# Patient Record
Sex: FEMALE | Race: WHITE | Employment: FULL TIME | URBAN - METROPOLITAN AREA
[De-identification: names, ages, dates, MRNs, and addresses within clinical notes are randomized per-mention and may not be internally consistent; named-entity substitution may affect disease eponyms.]

---

## 2019-04-11 ENCOUNTER — OFFICE VISIT (OUTPATIENT)
Dept: UROLOGY | Age: 39
End: 2019-04-11
Payer: COMMERCIAL

## 2019-04-11 ENCOUNTER — HOSPITAL ENCOUNTER (OUTPATIENT)
Age: 39
Setting detail: SPECIMEN
Discharge: HOME | End: 2019-04-11
Payer: COMMERCIAL

## 2019-04-11 VITALS
DIASTOLIC BLOOD PRESSURE: 88 MMHG | HEART RATE: 76 BPM | SYSTOLIC BLOOD PRESSURE: 130 MMHG | TEMPERATURE: 97.5 F | BODY MASS INDEX: 31.1 KG/M2 | HEIGHT: 62 IN | WEIGHT: 169 LBS

## 2019-04-11 DIAGNOSIS — R31.0 GROSS HEMATURIA: Primary | ICD-10-CM

## 2019-04-11 DIAGNOSIS — R31.0 GROSS HEMATURIA: ICD-10-CM

## 2019-04-11 PROBLEM — I77.74 VERTEBRAL ARTERY DISSECTION (HCC): Status: ACTIVE | Noted: 2018-04-20

## 2019-04-11 PROBLEM — I63.9 CEREBELLAR STROKE (HCC): Status: ACTIVE | Noted: 2018-05-09

## 2019-04-11 LAB
-: ABNORMAL
AMORPHOUS: ABNORMAL
BACTERIA: ABNORMAL
BILIRUBIN URINE: NEGATIVE
CASTS UA: ABNORMAL /LPF
COLOR: YELLOW
COMMENT UA: ABNORMAL
CRYSTALS, UA: ABNORMAL /HPF
EPITHELIAL CELLS UA: ABNORMAL /HPF (ref 0–25)
GLUCOSE URINE: NEGATIVE
KETONES, URINE: NEGATIVE
LEUKOCYTE ESTERASE, URINE: NEGATIVE
MUCUS: ABNORMAL
NITRITE, URINE: NEGATIVE
OTHER OBSERVATIONS UA: ABNORMAL
PH UA: 6 (ref 5–9)
PROTEIN UA: NEGATIVE
RBC UA: ABNORMAL /HPF (ref 0–2)
RENAL EPITHELIAL, UA: ABNORMAL /HPF
SPECIFIC GRAVITY UA: 1.02 (ref 1.01–1.02)
TRICHOMONAS: ABNORMAL
TURBIDITY: CLEAR
URINE HGB: NEGATIVE
UROBILINOGEN, URINE: NORMAL
WBC UA: ABNORMAL /HPF (ref 0–5)
YEAST: ABNORMAL

## 2019-04-11 PROCEDURE — 99204 OFFICE O/P NEW MOD 45 MIN: CPT | Performed by: NURSE PRACTITIONER

## 2019-04-11 PROCEDURE — 81001 URINALYSIS AUTO W/SCOPE: CPT

## 2019-04-11 PROCEDURE — 87086 URINE CULTURE/COLONY COUNT: CPT

## 2019-04-11 RX ORDER — CLOPIDOGREL BISULFATE 75 MG/1
75 TABLET ORAL
COMMUNITY
Start: 2018-04-24 | End: 2019-04-11 | Stop reason: ALTCHOICE

## 2019-04-11 RX ORDER — ATORVASTATIN CALCIUM 10 MG/1
10 TABLET, FILM COATED ORAL DAILY
COMMUNITY
Start: 2018-04-23

## 2019-04-11 RX ORDER — ESCITALOPRAM OXALATE 10 MG/1
10 TABLET ORAL DAILY
COMMUNITY
Start: 2019-02-11

## 2019-04-11 RX ORDER — ALPRAZOLAM 0.25 MG/1
0.25 TABLET ORAL
COMMUNITY
End: 2019-04-29 | Stop reason: ALTCHOICE

## 2019-04-11 ASSESSMENT — ENCOUNTER SYMPTOMS
ABDOMINAL PAIN: 0
WHEEZING: 0
COUGH: 0
NAUSEA: 0
COLOR CHANGE: 0
SHORTNESS OF BREATH: 0
CONSTIPATION: 0
EYE REDNESS: 0
EYE PAIN: 0
VOMITING: 0
BACK PAIN: 0

## 2019-04-11 NOTE — PATIENT INSTRUCTIONS
SURVEY:    You may be receiving a survey from [employer] regarding your visit today. Please complete the survey to enable us to provide the highest quality of care to you and your family. If you cannot score us a very good on any question, please call the office to discuss how we could have made your experience a very good one. Thank you.

## 2019-04-11 NOTE — PROGRESS NOTES
HPI:    Patient is a 41 y.o. female in no acute distress. She is alert and oriented to person, place, and time. New patient self referral for gross hematuria. Patient has had intermittent gross hematuria for the last 6 months. This is not associated with flank pain, but she did have lower abdominal pressure. She denies history of frequent urinary tract infections. She did have an episode of spontaneous stone passage several years ago and she did see Dr. Ciara Rapp for this. She denies baseline frequency, urgency, nocturia, or incontinence. 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 She was on Plavix, but this was stopped in 8/2018. She does take daily aspirin. Patient smoked for roughly 6 months in her early 25s. She does work as a .      Past Medical History:   Diagnosis Date    [facility] [address]     Hypertension     Kidney stone     Stroke (cerebrum) ([facility] [address] 2018     Past Surgical History:   Procedure Laterality Date    BREAST ENHANCEMENT SURGERY Left     ENDOMETRIAL ABLATION      TONSILLECTOMY      TUBAL LIGATION      WISDOM TOOTH EXTRACTION       Outpatient Encounter Medications as of 4/11/2019   Medication Sig Dispense Refill    aspirin 81 MG tablet Take 81 mg by mouth      atorvastatin (LIPITOR) 10 MG tablet Take 10 mg by mouth      escitalopram (LEXAPRO) 10 MG tablet       ALPRAZolam (XANAX) 0.25 MG tablet Take 0.25 mg by mouth. Indications: PRN       [DISCONTINUED] clopidogrel (PLAVIX) 75 MG tablet Take 75 mg by mouth       No facility-administered encounter medications on file as of 4/11/2019. Current Outpatient Medications on File Prior to Visit   Medication Sig Dispense Refill    aspirin 81 MG tablet Take 81 mg by mouth      atorvastatin (LIPITOR) 10 MG tablet Take 10 mg by mouth      escitalopram (LEXAPRO) 10 MG tablet       ALPRAZolam (XANAX) 0.25 MG tablet Take 0.25 mg by mouth.  Indications: PRN No current facility-administered medications on file prior to visit. Patient has no known allergies. Family History   Problem Relation Age of Onset    Kidney Disease Mother     Kidney stones Father     Prostate Cancer Father      Social History     Tobacco Use   Smoking Status Former Smoker   Smokeless Tobacco Never Used       Social History     Substance and Sexual Activity   Alcohol Use Yes    Comment: frequently       Review of Systems   Constitutional: Negative for appetite change, chills and fever. Eyes: Negative for pain, redness and visual disturbance. Respiratory: Negative for cough, shortness of breath and wheezing. Cardiovascular: Negative for chest pain and leg swelling. Gastrointestinal: Negative for abdominal pain, constipation, nausea and vomiting. Genitourinary: Positive for hematuria. Negative for difficulty urinating, dysuria, flank pain, frequency, pelvic pain, urgency, vaginal bleeding and vaginal discharge. Musculoskeletal: Negative for back pain, joint swelling and myalgias. Skin: Negative for color change, rash and wound. Neurological: Negative for dizziness, tremors and numbness. Hematological: Negative for adenopathy. Does not bruise/bleed easily. /88   Pulse 76   Temp 97.5 °F (36.4 °C) (Oral)   Ht 5' 2\" (1.575 m)   Wt 169 lb (76.7 kg)   LMP  (LMP Unknown)   Breastfeeding? No   BMI 30.91 kg/m²       PHYSICAL EXAM:  Constitutional: Patient resting comfortably, in no acute distress. Neuro: Alert and oriented to person place and time. Cranial nerves grossly intact. Psych: Mood and affect normal.  Skin: Warm, dry  HEENT: normocephalic, atraumatic  Lymphatics: No palpable lymphadenopathy  Lungs: Respiratory effort normal, unlabored  Cardiovascular:  Normal peripheral pulses  Abdomen: Soft, non-tender, non-distended with no organomegaly or palpable masses. : No CVA tenderness. Bladder non-tender and not distended.   Pelvic: Deferred    No results found for: BUN  No results found for: CREATININE    ASSESSMENT:   Diagnosis Orders   1. Gross hematuria  CT UROGRAM    Urinalysis with Microscopic    Urine culture clean catch           PLAN:  We will check a UA C&S today. We will proceed with a hematuria workup with a CT urogram, then she will return for lower tract visualization with cystoscopy.

## 2019-04-12 LAB
CULTURE: NO GROWTH
Lab: NORMAL
SPECIMEN DESCRIPTION: NORMAL

## 2019-04-15 ENCOUNTER — TELEPHONE (OUTPATIENT)
Dept: UROLOGY | Age: 39
End: 2019-04-15

## 2019-04-15 NOTE — TELEPHONE ENCOUNTER
----- Message from REN Wells - GASPER sent at 4/15/2019  9:01 AM EDT -----  Call pt - urine cx reviewed and negative for UTI & for significant microhematuria

## 2019-04-29 ENCOUNTER — PROCEDURE VISIT (OUTPATIENT)
Dept: UROLOGY | Age: 39
End: 2019-04-29
Payer: COMMERCIAL

## 2019-04-29 VITALS
BODY MASS INDEX: 31.65 KG/M2 | DIASTOLIC BLOOD PRESSURE: 78 MMHG | SYSTOLIC BLOOD PRESSURE: 122 MMHG | HEIGHT: 62 IN | WEIGHT: 172 LBS

## 2019-04-29 DIAGNOSIS — R31.0 GROSS HEMATURIA: Primary | ICD-10-CM

## 2019-04-29 DIAGNOSIS — R31.0 GROSS HEMATURIA: ICD-10-CM

## 2019-04-29 DIAGNOSIS — N20.1 URETERAL CALCULUS: ICD-10-CM

## 2019-04-29 PROCEDURE — 52000 CYSTOURETHROSCOPY: CPT | Performed by: UROLOGY

## 2019-04-29 PROCEDURE — 99214 OFFICE O/P EST MOD 30 MIN: CPT | Performed by: UROLOGY

## 2019-04-29 RX ORDER — TAMSULOSIN HYDROCHLORIDE 0.4 MG/1
0.4 CAPSULE ORAL DAILY
Qty: 30 CAPSULE | Refills: 0 | Status: SHIPPED | OUTPATIENT
Start: 2019-04-29

## 2019-04-29 ASSESSMENT — ENCOUNTER SYMPTOMS
COLOR CHANGE: 0
ABDOMINAL PAIN: 0
NAUSEA: 0
EYE REDNESS: 0
VOMITING: 0
EYE PAIN: 0
BACK PAIN: 0
COUGH: 0
SHORTNESS OF BREATH: 0
WHEEZING: 0

## 2019-04-29 NOTE — PROGRESS NOTES
HPI:    Patient is a 41 y.o. female in no acute distress. She is alert and oriented to person, place, and time. History  New patient self referral for gross hematuria. Patient has had intermittent gross hematuria for the last 6 months. This is not associated with flank pain, but she did have lower abdominal pressure. She denies history of frequent urinary tract infections. She did have an episode of spontaneous stone passage several years ago and she did see Dr. Femi Benton for this. She denies baseline frequency, urgency, nocturia, or incontinence. 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 She was on Plavix, but this was stopped in 8/2018. She does take daily aspirin. Patient smoked for roughly 6 months in her early 25s. She does work as a .      Currently  Patient is here today for lower tract visualization. Patient did have an episode of gross hematuria. Patient did have recent CT scan. This film was independently reviewed. This does show a stone in the distal left ureter. Patient has no left flank pain nausea or vomiting or fevers. She has no recent gross hematuria. The gross hematuria has been intermittent for the last 6 months. Patient's CT scan does make mention of medullary sponge kidney as well. No other significant calculi were visualized. Cystoscopy Procedure Note    Pre-operative Diagnosis: gross hematuria    Post-operative Diagnosis: Same/ ureteral calculi    Surgeon: Deanna Crowe     Assistants: None    Anesthesia : Local    Procedure Details   The risks, benefits, complications, treatment options, and expected outcomes were discussed with the patient. The patient concurred with the proposed plan, giving informed consent. Cystoscopy was performed today under local anesthesia, using sterile technique.  The patient was placed in the dorsal lithotomy position, prepped with CHG, and draped in the usual sterile fashion. A 14 Romanian flexible cystoscope was used to systematically inspect both the urethra and bladder in their entirety. Findings:  Anterior urethra: normal without strictures  Hyperplasia: na  Bladder: Normal mucosa, without lesions. Ureteral orifice(s) was/were seen in the normal position and effluxing clear urine  Trabeculations No  Diverticulum No  Description: Normal anatomy in the bladder, left ureter edematous with calculus protruding from the ureteral orifice         Specimens: none                 Complications:  None; patient tolerated the procedure well. Disposition: home           Condition: stable        Past Medical History:   Diagnosis Date    [facility] ([facility] [address]     Hypertension     Kidney stone     Stroke (cerebrum) ([facility] [address] 2018     Past Surgical History:   Procedure Laterality Date    BREAST ENHANCEMENT SURGERY Left     ENDOMETRIAL ABLATION      TONSILLECTOMY      TUBAL LIGATION      WISDOM TOOTH EXTRACTION       Outpatient Encounter Medications as of 4/29/2019   Medication Sig Dispense Refill    aspirin 81 MG tablet Take 81 mg by mouth      atorvastatin (LIPITOR) 10 MG tablet Take 10 mg by mouth      escitalopram (LEXAPRO) 10 MG tablet       [DISCONTINUED] ALPRAZolam (XANAX) 0.25 MG tablet Take 0.25 mg by mouth. Indications: PRN        No facility-administered encounter medications on file as of 4/29/2019. Current Outpatient Medications on File Prior to Visit   Medication Sig Dispense Refill    aspirin 81 MG tablet Take 81 mg by mouth      atorvastatin (LIPITOR) 10 MG tablet Take 10 mg by mouth      escitalopram (LEXAPRO) 10 MG tablet        No current facility-administered medications on file prior to visit. Patient has no known allergies.   Family History   Problem Relation Age of Onset    Kidney Disease Mother     Kidney stones Father     Prostate Cancer Father      Social History     Tobacco Use   Smoking Status Former Smoker   Smokeless Tobacco Never Used       Social History     Substance and Sexual Activity   Alcohol Use Yes    Comment: frequently       Review of Systems   Constitutional: Negative for appetite change, chills and fever. Eyes: Negative for pain, redness and visual disturbance. Respiratory: Negative for cough, shortness of breath and wheezing. Cardiovascular: Negative for chest pain and leg swelling. Gastrointestinal: Negative for abdominal pain, nausea and vomiting. Genitourinary: Negative for difficulty urinating, dysuria, flank pain, frequency, hematuria, pelvic pain, vaginal bleeding and vaginal discharge. Musculoskeletal: Negative for back pain, joint swelling and myalgias. Skin: Negative for color change, rash and wound. Neurological: Negative for dizziness, tremors and numbness. Hematological: Negative for adenopathy. Does not bruise/bleed easily. /78   Ht 5' 2\" (1.575 m)   Wt 172 lb (78 kg)   LMP  (LMP Unknown)   Breastfeeding? No   BMI 31.46 kg/m²       PHYSICAL EXAM:  Constitutional: Patient resting comfortably, in no acute distress. Neuro: Alert and oriented to person place and time. Cranial nerves grossly intact. Psych: Mood and affect normal.  Skin: Warm, dry  HEENT: normocephalic, atraumatic  Lymphatics: No palpable lymphadenopathy  Lungs: Respiratory effort normal, unlabored  Cardiovascular:  Normal peripheral pulses  Abdomen: Soft, non-tender, non-distended with no organomegaly or palpable masses. : No CVA tenderness. Bladder non-tender and not distended. Pelvic: no vaginal atrophy    No results found for: BUN  No results found for: CREATININE    ASSESSMENT:  This is a 41 y.o. female with the following diagnoses:   Diagnosis Orders   1. Gross hematuria  OK CYSTOURETHROSCOPY   2. Ureteral calculus  tamsulosin (FLOMAX) 0.4 MG capsule    XR ABDOMEN (KUB) (SINGLE AP VIEW)         PLAN:  Patient is clear from a gross hematuria standpoint.   She will follow-up with us in 4 weeks with a repeat KUB. I did call her and Flomax today. She will increase her fluids. She will call us if she does spontaneously pass her calculi.

## 2019-05-02 ENCOUNTER — TELEPHONE (OUTPATIENT)
Dept: UROLOGY | Age: 39
End: 2019-05-02

## 2019-05-02 DIAGNOSIS — N20.1 URETERAL CALCULUS: Primary | ICD-10-CM

## 2019-05-02 NOTE — TELEPHONE ENCOUNTER
Take flomax for three more days    RTC as planned in 4 weeks with a KUB prior. Bring in stone for analysis.

## 2019-05-02 NOTE — TELEPHONE ENCOUNTER
Patient called and said she passed her stone. She is feeling good, just slight discomfort. Should she still take flomax? Should she still have x-ray and follow up as scheduled? What should she do now?

## 2019-05-31 DIAGNOSIS — N20.1 URETERAL CALCULUS: ICD-10-CM

## 2019-06-03 ENCOUNTER — HOSPITAL ENCOUNTER (OUTPATIENT)
Age: 39
Setting detail: SPECIMEN
Discharge: HOME | End: 2019-06-03
Payer: COMMERCIAL

## 2019-06-03 ENCOUNTER — OFFICE VISIT (OUTPATIENT)
Dept: UROLOGY | Age: 39
End: 2019-06-03
Payer: COMMERCIAL

## 2019-06-03 VITALS
HEART RATE: 74 BPM | BODY MASS INDEX: 31.1 KG/M2 | SYSTOLIC BLOOD PRESSURE: 129 MMHG | DIASTOLIC BLOOD PRESSURE: 82 MMHG | HEIGHT: 62 IN | WEIGHT: 169 LBS

## 2019-06-03 DIAGNOSIS — R31.0 GROSS HEMATURIA: ICD-10-CM

## 2019-06-03 DIAGNOSIS — N20.1 URETERAL CALCULUS: Primary | ICD-10-CM

## 2019-06-03 DIAGNOSIS — N20.1 URETERAL CALCULUS: ICD-10-CM

## 2019-06-03 PROCEDURE — 99213 OFFICE O/P EST LOW 20 MIN: CPT | Performed by: NURSE PRACTITIONER

## 2019-06-03 PROCEDURE — 82365 CALCULUS SPECTROSCOPY: CPT

## 2019-06-03 ASSESSMENT — ENCOUNTER SYMPTOMS
NAUSEA: 0
COLOR CHANGE: 0
BACK PAIN: 0
WHEEZING: 0
EYE REDNESS: 0
EYE PAIN: 0
COUGH: 0
CONSTIPATION: 0
VOMITING: 0
ABDOMINAL PAIN: 0
SHORTNESS OF BREATH: 0

## 2019-06-03 NOTE — PROGRESS NOTES
HPI:    Patient is a 41 y.o. female in no acute distress. She is alert and oriented to person, place, and time. History  4/2019 Self referral for gross hematuria. Patient has had intermittent gross hematuria for the last 6 months. This is not associated with flank pain, but she did have lower abdominal pressure. She denies history of frequent urinary tract infections. She did have an episode of spontaneous stone passage several years ago and she did see Dr. Ag Quach for this. She denies baseline frequency, urgency, nocturia, or incontinence. 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 She was on Plavix, but this was stopped in 8/2018. She does take daily aspirin. Patient smoked for roughly 6 months in her early 25s. She does work as a .    5/2019 CT showed a stone in the distal left ureter, medullary sponge kidney as well. Cystoscopy showed normal anatomy in the bladder, left ureter edematous with calculus protruding from the ureteral orifice. Today  Here today for a 4 week follow-up after spontaneous stone passage. She did bring her stone and with her today for analysis. She denies any dysuria, gross hematuria, flank or abdominal pain. She did have a KUB completed prior to today's visit. This film was independently reviewed and shows a possible left renal stone, but there is no evidence of ureteral stones.     Past Medical History:   Diagnosis Date    Clotting disorder ([address]     Hypertension     Kidney stone     Stroke (cerebrum) ([address] 2018     Past Surgical History:   Procedure Laterality Date    BREAST ENHANCEMENT SURGERY Left     ENDOMETRIAL ABLATION      TONSILLECTOMY      TUBAL LIGATION      WISDOM TOOTH EXTRACTION       Outpatient Encounter Medications as of 6/3/2019   Medication Sig Dispense Refill    aspirin 81 MG tablet Take 81 mg by mouth daily       atorvastatin (LIPITOR) 10 MG tablet Take 10 mg by mouth Ht 5' 2\" (1.575 m)   Wt 169 lb (76.7 kg)   BMI 30.91 kg/m²       PHYSICAL EXAM:  Constitutional: Patient resting comfortably, in no acute distress. Neuro: Alert and oriented to person place and time. Cranial nerves grossly intact. Psych: Mood and affect normal.  Skin: Warm, dry  HEENT: normocephalic, atraumatic  Lymphatics: No palpable lymphadenopathy  Lungs: Respiratory effort normal, unlabored  Cardiovascular:  Normal peripheral pulses  Abdomen: Soft, non-tender, non-distended with no organomegaly or palpable masses. : No CVA tenderness. Bladder non-tender and not distended. Pelvic: Deferred    No results found for: BUN  No results found for: CREATININE    ASSESSMENT:   Diagnosis Orders   1. Ureteral calculus  XR ABDOMEN (KUB) (SINGLE AP VIEW)   2. Gross hematuria             PLAN:  Educated on dietary stone prevention:  1) drink around 80 ounces of water per day  2) Avoid/minimize intake of \"bad fluids\" (soda pop, coffee, tea, alcohol, energy drinks)  3) reduce consumption of sodium/salt  4) reduce consumption of fatty animal protein (full-fat cheese/milk/dairy, red meats, pork). Limit protein intake to low-fat/lean options (low-fat dairy, fish, chicken, turkey)    We will send her stone for analysis. We will see her on an annual basis with a KUB prior or sooner if needed for new or worsening symptoms.

## 2019-06-06 ENCOUNTER — TELEPHONE (OUTPATIENT)
Dept: UROLOGY | Age: 39
End: 2019-06-06

## 2019-06-06 LAB
STONE COMPOSITION: NORMAL
STONE DESCRIPTION: NORMAL
STONE MASS: 35 MG
STONE NUMBER: 2
STONE SIZE: NORMAL MM

## 2019-06-06 NOTE — TELEPHONE ENCOUNTER
----- Message from REN Knowles CNP sent at 6/6/2019 10:24 AM EDT -----  Alexus Velasco analysis shows calcium. To prevent future stone formation:  1) drink around 80 ounces of water per day  2) Avoid/minimize intake of \"bad fluids\" (soda pop, coffee, tea, alcohol, energy drinks)  3) reduce consumption of sodium/salt  4) reduce consumption of fatty animal protein (full-fat cheese/milk/dairy, red meats, pork).  Limit protein intake to low-fat/lean options (low-fat dairy, fish, chicken, turkey)

## 2019-08-05 ENCOUNTER — TELEPHONE (OUTPATIENT)
Dept: UROLOGY | Age: 39
End: 2019-08-05

## 2019-08-05 DIAGNOSIS — R31.0 GROSS HEMATURIA: Primary | ICD-10-CM

## 2019-08-05 NOTE — TELEPHONE ENCOUNTER
Patient called with the complaint of blood in the urine that started yesterday. She denies any other symptoms of abdominal/flank pain, nausea, fever or burning with urination. She does have a history of stones and was seen in June. She had a cystoscopy done in April which was clear. Patient lives out of town and has a busy schedule and prefers not to schedule an appointment, but is asking if we could order testing/imaging and have it sent to [facility] or [address] [place] [address].     (Patient is requesting when we return a call to her to leave a message on her voicemail)

## 2019-08-05 NOTE — TELEPHONE ENCOUNTER
Called and left message of response on patient voice mail per her request.  Orders were faxed to [facility].

## 2019-08-07 DIAGNOSIS — R31.0 GROSS HEMATURIA: ICD-10-CM

## 2019-08-08 ENCOUNTER — TELEPHONE (OUTPATIENT)
Dept: UROLOGY | Age: 39
End: 2019-08-08

## 2019-08-08 DIAGNOSIS — R31.0 GROSS HEMATURIA: ICD-10-CM

## 2019-08-08 DIAGNOSIS — N23 RENAL COLIC: Primary | ICD-10-CM

## 2019-08-12 ENCOUNTER — TELEPHONE (OUTPATIENT)
Dept: UROLOGY | Age: 39
End: 2019-08-12

## 2019-08-12 DIAGNOSIS — R31.0 GROSS HEMATURIA: ICD-10-CM

## 2019-08-12 DIAGNOSIS — N23 RENAL COLIC: ICD-10-CM

## 2019-08-12 NOTE — TELEPHONE ENCOUNTER
----- Message from REN Goetz - CNP sent at 8/12/2019  8:57 AM EDT -----  Call pt - urine cx reviewed and negative for UTI & for significant microhematuria

## 2019-08-12 NOTE — TELEPHONE ENCOUNTER
Contacted the patient and advised her of the recent negative urine results. Patient voiced understanding.

## 2019-08-23 ENCOUNTER — TELEPHONE (OUTPATIENT)
Dept: UROLOGY | Age: 39
End: 2019-08-23

## 2020-06-04 ENCOUNTER — TELEPHONE (OUTPATIENT)
Dept: UROLOGY | Age: 40
End: 2020-06-04

## 2022-10-27 NOTE — TELEPHONE ENCOUNTER
Left message for patient to return a call to the office. Sski Pregnancy And Lactation Text: This medication is Pregnancy Category D and isn't considered safe during pregnancy. It is excreted in breast milk.